# Patient Record
Sex: FEMALE | Race: WHITE | NOT HISPANIC OR LATINO | Employment: OTHER | URBAN - METROPOLITAN AREA
[De-identification: names, ages, dates, MRNs, and addresses within clinical notes are randomized per-mention and may not be internally consistent; named-entity substitution may affect disease eponyms.]

---

## 2020-03-13 ENCOUNTER — TRANSFERRED RECORDS (OUTPATIENT)
Dept: HEALTH INFORMATION MANAGEMENT | Facility: CLINIC | Age: 60
End: 2020-03-13

## 2021-05-12 ENCOUNTER — TRANSFERRED RECORDS (OUTPATIENT)
Dept: HEALTH INFORMATION MANAGEMENT | Facility: CLINIC | Age: 61
End: 2021-05-12

## 2021-05-18 ENCOUNTER — TRANSFERRED RECORDS (OUTPATIENT)
Dept: HEALTH INFORMATION MANAGEMENT | Facility: CLINIC | Age: 61
End: 2021-05-18

## 2021-06-03 ENCOUNTER — TRANSFERRED RECORDS (OUTPATIENT)
Dept: HEALTH INFORMATION MANAGEMENT | Facility: CLINIC | Age: 61
End: 2021-06-03

## 2021-06-18 ENCOUNTER — TRANSFERRED RECORDS (OUTPATIENT)
Dept: HEALTH INFORMATION MANAGEMENT | Facility: CLINIC | Age: 61
End: 2021-06-18

## 2021-06-21 ENCOUNTER — TRANSFERRED RECORDS (OUTPATIENT)
Dept: HEALTH INFORMATION MANAGEMENT | Facility: CLINIC | Age: 61
End: 2021-06-21

## 2021-06-23 ENCOUNTER — TRANSCRIBE ORDERS (OUTPATIENT)
Dept: OTHER | Age: 61
End: 2021-06-23

## 2021-06-23 DIAGNOSIS — C50.919 BREAST CANCER (H): Primary | ICD-10-CM

## 2021-06-24 ENCOUNTER — PRE VISIT (OUTPATIENT)
Dept: OTHER | Age: 61
End: 2021-06-24

## 2021-06-24 NOTE — TELEPHONE ENCOUNTER
Action 06/24/2021   Action Taken REQUEST SENT TO [place] AND  FOR CENTRA CARE PULL IN ALSO REQUESTED IMAGING. CDK

## 2021-06-25 NOTE — TELEPHONE ENCOUNTER
RECORDS STATUS - BREAST    RECORDS REQUESTED FROM: [facility]/ [facility] [place]    DATE REQUESTED: 7/12/2021   NOTES DETAILS STATUS   OFFICE NOTE from referring provider     OFFICE NOTE from medical oncologist     OFFICE NOTE from surgeon     OFFICE NOTE from radiation oncologist     DISCHARGE SUMMARY from hospital     DISCHARGE REPORT from the ER     OPERATIVE REPORT     MEDICATION LIST     CLINICAL TRIAL TREATMENTS TO DATE     LABS     PATHOLOGY REPORTS  (Tissue diagnosis, Stage, ER/VT percentage positive and intensity of staining, HER2 IHC, FISH, and all biopsies from breast and any distant metastasis)                     GENONOMIC TESTING     TYPE:   (Next Generation Sequencing, including Foundation One testing, and Oncotype score)     IMAGING (NEED IMAGES & REPORT)     CT SCANS     MRI     MAMMO Requested- [facility] 5/18/2021, 5/12/2021   ULTRASOUND Requested- [facility] in [place]     Ph: [phone]  Fax: [phone]    Mailing Address:   [address] [place] [address]    [employer] Shipping Label: [id number] US Guide Breast Biopsy 6/3/2021    US Breast Unilateral Limited 5/18/2021   PET     BONE SCAN     BRAIN MRI       Action    Action Taken 6/25/2021 3:37pm     I called [facility] to have IMG pushed to PACS Ph: [phone]

## 2021-07-10 NOTE — PROGRESS NOTES
Jace is a 61 year old who is being evaluated via a billable telephone visit.      What phone number would you like to be contacted at? [phone]  How would you like to obtain your AVS? Mail a copy     EFRAIN Blair    Phone call duration: 50 minutes, an additional 20 minutes was spent on the day of the visit reviewing medical records, viewing and interpreting tests, and in documentation.    Type of service:  This was scheduled as a video visit, however, patient was unable to do a video visit therefore a telephone visit was performed.    Oncology Consultation:  Date on this visit: 7/12/2021    Cassandra Villanueva is referred for an oncology consultation. She requires evaluation for new diagnosis of left breast cancer.    Primary Physician: Jessie Youssef     History Of Present Illness:  Ms. Villanueva is a 61 year old female with left breast cancer.  Routine screening mammograms on 5/12/2021 showed an asymmetric density in the upper outer left breast.  Diagnostic imaging confirmed this finding.  Ultrasound showed an irregular spiculated mass in the left breast at 2:00, 4 cm from the nipple measuring 6 mm.  Biopsy was c/w a grade 2 invasive ductal carcinoma, ER strong in 100%, NE moderate in 30%.  HER-2 was equivocal by IHC (2+) and negative by FISH.  BRCAstat genetic testing returned negative.  CancerNext genetic panel returned positive for a VUS (p.P430L) in MUTYH.    Ms. Villanueva was contacted via telephone today as she was unable to get the video visit to work.  She routinely has an annual mammograms.  She denies prior callbacks.  She has had no prior breast issues or biopsies.  She has 3 sisters with a h/o breast cancer.  She reports menarche at 10 years old.  She has two children.  Her first pregnancy was at 24 yo, the second at age 29 yo.  She  each of her children for a few months.  She underwent menopause at 41 years old, at the time of a BSO and hysterectomy performed for menorrhagia with passing  of large clots.    Patient states she is in good health.  She takes lisinopril and atorvastatin for hypertension and hyperlipidemia respectively.  Although she has multiple family members that smoke, she is a lifetime nonsmoker.  She has no current bone or joint aches or pains.  She denies cough, shortness of breath, or chest pain.  She has no abdominal complaints.  She has no current skin rashes or lesions.  She denies headaches, visual changes, or focal neurologic complaints.  The remainder of a complete 12 point review of systems was reviewed with the patient and was negative with the exception of that mentioned above.    Past Medical/Surgical History:  1.  Breast cancer as per HPI  2.  Hyperlipidemia  3.  Hypertension  4.  H/o HSV-1  5.  Bunionectomy  6.  Hysterectomy and BSO    Allergies:  Allergies as of 2021     (Not on File)     Current Medications:  Current Outpatient Medications   Medication     atorvastatin (LIPITOR) 20 MG tablet     lisinopril (ZESTRIL) 10 MG tablet     No current facility-administered medications for this visit.     Family History:  Three sisters with a h/o breast cancer, one sister with triple negative breast cancer is currently undergoing treatment, sister had negative genetic testing.  Her oldest sister has dementia and is not likely to have the genetic testing.  The third sister with a h/o breast cancer plans to move forward genetic testing.  Sister with a h/o adrenal carcinoma.  Brother  of prostate cancer.  Two brothers with h/o lung cancer.    Social History:  .  Has two children, ages 33 and 36 years old.  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  She is a lifetime nonsmoker.  She has 1-2 drinks with alcohol per week.    Physical Exam:  A comprehensive physical examination is deferred due to PHE (public health emergency) telephone visit restrictions.    Laboratory/Imaging  Studies  5/12/2021 Bilateral screening mammograms:  - In the upper outer left breast is a new small area of asymmetric density.    5/18/2021 Bilateral diagnostic mammograms:  Persistent focal asymmetry upper outer left breast.  Tiny intramammary lymph nodes within the upper outer breast.        5/18/2021 Left breast ultrasound:  Irregular, spiculated mass in the left breast at 2 o'clock, 4 cm from the nipple measuring 6 mm.  Small intramammary lymph nodes with preserved fatty david and no cortical thickening.    6/3/2021 Left breast biopsy:  Grade 2 invasive ductal carcinoma  Focal high-grade DCIS  In the invasive carcinoma ER is strong in 100% of tumor cell nuclei; MO is moderate in 30%.  HER-2 is equivocal by IHC (2+)  Ki-67 is 40%    6/3/2021 HER2 FISH ([place]):  Negative  Average HER2 signals per cell = 3.4  HER2/D17Z1 ratio = 1.07    ASSESSMENT/PLAN:  Ms. Villanueva is a 62 yo female with clinical stage Ia, [id number], grade 2, ER positive (100%), MO positive (30%), and HER-2 negative invasive ductal carcinoma of the left breast.  Today I discussed the diagnosis, staging, and treatment options in detail with the patient.  On my view of the left breast mammogram, the breast mass measures approximately 1 cm.  Left breast ultrasound demonstrates the mass to measure 6 mm.  This is therefore a T1b breast cancer.  She is asymptomatic of lymph node or distant metastasis.  Clinical staging at this time is therefore [id number] or stage Ia.  The goal of treatment is cure.    We reviewed treatment options including surgery, chemotherapy, radiation, and endocrine therapy.  We discussed surgical excision of breast cancer can be performed via either mastectomy or lumpectomy.  She specifies reason for second opinion was to get my opinion on type of breast surgery that should be performed given her strong family history.  We discussed that given the small size of the breast cancer and no pathogenic germline mutation in her genetic  testing, I feel a lumpectomy would be reasonable.  However, we discussed it is possible she has a gene mutation that increases risk for breast cancer and accounts for her strong family history of breast cancer, but is not yet known and therefore she may remain at high risk for future breast cancers.  If she elects to undergo lumpectomy, she should be screened with both annual mammograms and breast MRIs in the future.  Plan would be to space these studies out 6 months apart, so that she is having some form of breast imaging once every 6 months.  Her axillary lymph nodes will be definitively staged at the time of surgery.  She has met with a local surgeon, Dr. Mike Oneil and plan is to schedule surgery as early as next week at [facility] in [place].    If she elects to undergo a lumpectomy or she has a positive lymph node at surgery, adjuvant radiation will be recommended.  Radiation is typically given over a period of a few weeks with treatments administered Monday - Friday or 5 days/week.  We reviewed the common side effects of radiation.    We then discussed the roles of systemic therapies such as chemotherapy and endocrine therapy in treating micrometastic disease and reducing the future risk of distant recurrence.  Not every early stage, estrogen receptor positive breast cancer benefits from chemotherapy.  We use Oncotype DX in order to determine chemotherapy benefit.  Oncotype DX evaluates expression levels of 21 genes in the breast cancer and correlates expression levels to a recurrence score.  Postmenopausal patients with a low risk recurrence score (25 or <) generally do not benefit from chemotherapy, whereas those with a high risk recurrence score (26 or >) do.  The biggest predictor of a low risk Oncotype are low grade and high progesterone receptor positivity.  Given features of intermediate grade, IN of only 30%, and Ki-67 of 40% (>15% is considered high) would recommend sending an Oncotype DX on  the tumor excised at surgery.  If the Oncotype DX suggests benefit to chemotherapy, this would be performed prior to radiation.    Finally, we will plan to treat with adjuvant endocrine therapy.  A 5 year course of aromatase inhibitor in the treatment of postmenopausal women with ER positive breast cancer can reduce the risk of recurrence by half.  In addition, it will reduce the risk of new ipsilateral and contralateral breast cancers as well.  We discussed this will be in the form of a pill taken daily.  We reviewed common side effects of endocrine therapy including myalgias, arthralgias, hot flashes, vaginal dryness, mood lability, and thinning of the bones.  We would plan to start endocrine therapy upon completion of radiation.    The plan at this time is to proceed with surgery.  We will obtain the pathology results when they are available and will touch base regarding adjuvant treatment plan.

## 2021-07-12 ENCOUNTER — VIRTUAL VISIT (OUTPATIENT)
Dept: ONCOLOGY | Facility: CLINIC | Age: 61
End: 2021-07-12
Attending: INTERNAL MEDICINE
Payer: COMMERCIAL

## 2021-07-12 ENCOUNTER — PRE VISIT (OUTPATIENT)
Dept: ONCOLOGY | Facility: CLINIC | Age: 61
End: 2021-07-12

## 2021-07-12 DIAGNOSIS — C50.412 MALIGNANT NEOPLASM OF UPPER-OUTER QUADRANT OF LEFT BREAST IN FEMALE, ESTROGEN RECEPTOR POSITIVE (H): Primary | ICD-10-CM

## 2021-07-12 DIAGNOSIS — Z80.3 FAMILY HISTORY OF MALIGNANT NEOPLASM OF BREAST: ICD-10-CM

## 2021-07-12 DIAGNOSIS — Z17.0 MALIGNANT NEOPLASM OF UPPER-OUTER QUADRANT OF LEFT BREAST IN FEMALE, ESTROGEN RECEPTOR POSITIVE (H): Primary | ICD-10-CM

## 2021-07-12 PROCEDURE — 999N001193 HC VIDEO/TELEPHONE VISIT; NO CHARGE

## 2021-07-12 PROCEDURE — 99417 PROLNG OP E/M EACH 15 MIN: CPT | Performed by: INTERNAL MEDICINE

## 2021-07-12 PROCEDURE — 99215 OFFICE O/P EST HI 40 MIN: CPT | Mod: TEL | Performed by: INTERNAL MEDICINE

## 2021-07-12 RX ORDER — LISINOPRIL 10 MG/1
10 TABLET ORAL
COMMUNITY
Start: 2021-04-06

## 2021-07-12 RX ORDER — ATORVASTATIN CALCIUM 20 MG/1
20 TABLET, FILM COATED ORAL
COMMUNITY
Start: 2021-01-04

## 2021-07-12 NOTE — LETTER
7/12/2021         RE: Cassandra Villanueva  [id number] [address] [address]        Dear Colleague,    Thank you for referring your patient, Cassandra Villanueva, to the [facility]. Please see a copy of my visit note below.    Jace is a 61 year old who is being evaluated via a billable telephone visit.      What phone number would you like to be contacted at? [phone]  How would you like to obtain your AVS? Mail a copy     EFRAIN Blair    Phone call duration: 50 minutes, an additional 20 minutes was spent on the day of the visit reviewing medical records, viewing and interpreting tests, and in documentation.    Type of service:  This was scheduled as a video visit, however, patient was unable to do a video visit therefore a telephone visit was performed.    Oncology Consultation:  Date on this visit: 7/12/2021    Cassandra Villanueva is referred for an oncology consultation. She requires evaluation for new diagnosis of left breast cancer.    Primary Physician: Jessie Youssef     History Of Present Illness:  Ms. Villanueva is a 61 year old female with left breast cancer.  Routine screening mammograms on 5/12/2021 showed an asymmetric density in the upper outer left breast.  Diagnostic imaging confirmed this finding.  Ultrasound showed an irregular spiculated mass in the left breast at 2:00, 4 cm from the nipple measuring 6 mm.  Biopsy was c/w a grade 2 invasive ductal carcinoma, ER strong in 100%, SC moderate in 30%.  HER-2 was equivocal by IHC (2+) and negative by FISH.  BRCAstat genetic testing returned negative.  CancerNext genetic panel returned positive for a VUS (p.P430L) in MUTYH.    Ms. Villanueva was contacted via telephone today as she was unable to get the video visit to work.  She routinely has an annual mammograms.  She denies prior callbacks.  She has had no prior breast issues or biopsies.  She has 3 sisters with a h/o breast cancer.  She reports menarche at 10 years old.   She has two children.  Her first pregnancy was at 24 yo, the second at age 27 yo.  She  each of her children for a few months.  She underwent menopause at 41 years old, at the time of a BSO and hysterectomy performed for menorrhagia with passing of large clots.    Patient states she is in good health.  She takes lisinopril and atorvastatin for hypertension and hyperlipidemia respectively.  Although she has multiple family members that smoke, she is a lifetime nonsmoker.  She has no current bone or joint aches or pains.  She denies cough, shortness of breath, or chest pain.  She has no abdominal complaints.  She has no current skin rashes or lesions.  She denies headaches, visual changes, or focal neurologic complaints.  The remainder of a complete 12 point review of systems was reviewed with the patient and was negative with the exception of that mentioned above.    Past Medical/Surgical History:  1.  Breast cancer as per HPI  2.  Hyperlipidemia  3.  Hypertension  4.  H/o HSV-1  5.  Bunionectomy  6.  Hysterectomy and BSO    Allergies:  Allergies as of 2021     (Not on File)     Current Medications:  Current Outpatient Medications   Medication     atorvastatin (LIPITOR) 20 MG tablet     lisinopril (ZESTRIL) 10 MG tablet     No current facility-administered medications for this visit.     Family History:  Three sisters with a h/o breast cancer, one sister with triple negative breast cancer is currently undergoing treatment, sister had negative genetic testing.  Her oldest sister has dementia and is not likely to have the genetic testing.  The third sister with a h/o breast cancer plans to move forward genetic testing.  Sister with a h/o adrenal carcinoma.  Brother  of prostate cancer.  Two brothers with h/o lung cancer.    Social History:  .  Has two children, ages 33 and 36 years old.  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  She is a lifetime nonsmoker.  She has 1-2 drinks with alcohol per week.    Physical Exam:  A comprehensive physical examination is deferred due to PHE (public health emergency) telephone visit restrictions.    Laboratory/Imaging Studies  5/12/2021 Bilateral screening mammograms:  - In the upper outer left breast is a new small area of asymmetric density.    5/18/2021 Bilateral diagnostic mammograms:  Persistent focal asymmetry upper outer left breast.  Tiny intramammary lymph nodes within the upper outer breast.        5/18/2021 Left breast ultrasound:  Irregular, spiculated mass in the left breast at 2 o'clock, 4 cm from the nipple measuring 6 mm.  Small intramammary lymph nodes with preserved fatty david and no cortical thickening.    6/3/2021 Left breast biopsy:  Grade 2 invasive ductal carcinoma  Focal high-grade DCIS  In the invasive carcinoma ER is strong in 100% of tumor cell nuclei; TX is moderate in 30%.  HER-2 is equivocal by IHC (2+)  Ki-67 is 40%    6/3/2021 HER2 FISH ([place]):  Negative  Average HER2 signals per cell = 3.4  HER2/D17Z1 ratio = 1.07    ASSESSMENT/PLAN:  Ms. Villanueva is a 60 yo female with clinical stage Ia, [id number], grade 2, ER positive (100%), TX positive (30%), and HER-2 negative invasive ductal carcinoma of the left breast.  Today I discussed the diagnosis, staging, and treatment options in detail with the patient.  On my view of the left breast mammogram, the breast mass measures approximately 1 cm.  Left breast ultrasound demonstrates the mass to measure 6 mm.  This is therefore a T1b breast cancer.  She is asymptomatic of lymph node or distant metastasis.  Clinical staging at this time is therefore [id number] or stage Ia.  The goal of treatment is cure.    We reviewed treatment options including surgery, chemotherapy, radiation, and endocrine therapy.  We discussed surgical excision of breast cancer can be performed via either mastectomy or lumpectomy.  She  specifies reason for second opinion was to get my opinion on type of breast surgery that should be performed given her strong family history.  We discussed that given the small size of the breast cancer and no pathogenic germline mutation in her genetic testing, I feel a lumpectomy would be reasonable.  However, we discussed it is possible she has a gene mutation that increases risk for breast cancer and accounts for her strong family history of breast cancer, but is not yet known and therefore she may remain at high risk for future breast cancers.  If she elects to undergo lumpectomy, she should be screened with both annual mammograms and breast MRIs in the future.  Plan would be to space these studies out 6 months apart, so that she is having some form of breast imaging once every 6 months.  Her axillary lymph nodes will be definitively staged at the time of surgery.  She has met with a local surgeon, Dr. Mike Oneil and plan is to schedule surgery as early as next week at [facility] in [place].    If she elects to undergo a lumpectomy or she has a positive lymph node at surgery, adjuvant radiation will be recommended.  Radiation is typically given over a period of a few weeks with treatments administered Monday - Friday or 5 days/week.  We reviewed the common side effects of radiation.    We then discussed the roles of systemic therapies such as chemotherapy and endocrine therapy in treating micrometastic disease and reducing the future risk of distant recurrence.  Not every early stage, estrogen receptor positive breast cancer benefits from chemotherapy.  We use Oncotype DX in order to determine chemotherapy benefit.  Oncotype DX evaluates expression levels of 21 genes in the breast cancer and correlates expression levels to a recurrence score.  Postmenopausal patients with a low risk recurrence score (25 or <) generally do not benefit from chemotherapy, whereas those with a high risk recurrence score (26  or >) do.  The biggest predictor of a low risk Oncotype are low grade and high progesterone receptor positivity.  Given features of intermediate grade, MN of only 30%, and Ki-67 of 40% (>15% is considered high) would recommend sending an Oncotype DX on the tumor excised at surgery.  If the Oncotype DX suggests benefit to chemotherapy, this would be performed prior to radiation.    Finally, we will plan to treat with adjuvant endocrine therapy.  A 5 year course of aromatase inhibitor in the treatment of postmenopausal women with ER positive breast cancer can reduce the risk of recurrence by half.  In addition, it will reduce the risk of new ipsilateral and contralateral breast cancers as well.  We discussed this will be in the form of a pill taken daily.  We reviewed common side effects of endocrine therapy including myalgias, arthralgias, hot flashes, vaginal dryness, mood lability, and thinning of the bones.  We would plan to start endocrine therapy upon completion of radiation.    The plan at this time is to proceed with surgery.  We will obtain the pathology results when they are available and will touch base regarding adjuvant treatment plan.            Again, thank you for allowing me to participate in the care of your patient.        Sincerely,        Adriana Ashraf MD

## 2021-07-20 ENCOUNTER — TRANSFERRED RECORDS (OUTPATIENT)
Dept: HEALTH INFORMATION MANAGEMENT | Facility: CLINIC | Age: 61
End: 2021-07-20
Payer: COMMERCIAL

## 2021-07-21 PROBLEM — C50.412 MALIGNANT NEOPLASM OF UPPER-OUTER QUADRANT OF LEFT BREAST IN FEMALE, ESTROGEN RECEPTOR POSITIVE (H): Status: ACTIVE | Noted: 2021-07-21

## 2021-07-21 PROBLEM — Z17.0 MALIGNANT NEOPLASM OF UPPER-OUTER QUADRANT OF LEFT BREAST IN FEMALE, ESTROGEN RECEPTOR POSITIVE (H): Status: ACTIVE | Noted: 2021-07-21

## 2021-07-28 ENCOUNTER — PATIENT OUTREACH (OUTPATIENT)
Dept: ONCOLOGY | Facility: CLINIC | Age: 61
End: 2021-07-28

## 2021-07-28 NOTE — PROGRESS NOTES
Called patient and left a VM to return call to writer to discuss Dr Ashraf's recommendations. Janell Segal RN, BSN  [facility] Nurse Coordinator  Patient returned call and discussed Dr Ashraf's review of her Pathology received from her Oncologist and to have an OncoType done either with that Oncologist and offered Dr Ashraf to order. Patient has a follow-up with that Oncologist Dr Campbell August 16th and will call to have him order OncoType. Discussed seeing a Lymphedema Clinic with 16 lymph nodes removed.  Answered all patient's questions and verbalized understanding. Janell Segal RN, BSN.